# Patient Record
Sex: FEMALE | Race: WHITE | Employment: UNEMPLOYED | URBAN - METROPOLITAN AREA
[De-identification: names, ages, dates, MRNs, and addresses within clinical notes are randomized per-mention and may not be internally consistent; named-entity substitution may affect disease eponyms.]

---

## 2022-07-31 ENCOUNTER — HOSPITAL ENCOUNTER (EMERGENCY)
Age: 22
Discharge: HOME | End: 2022-07-31
Attending: STUDENT IN AN ORGANIZED HEALTH CARE EDUCATION/TRAINING PROGRAM
Payer: COMMERCIAL

## 2022-07-31 ENCOUNTER — APPOINTMENT (OUTPATIENT)
Dept: GENERAL RADIOLOGY | Age: 22
End: 2022-07-31
Attending: STUDENT IN AN ORGANIZED HEALTH CARE EDUCATION/TRAINING PROGRAM
Payer: COMMERCIAL

## 2022-07-31 VITALS
HEART RATE: 102 BPM | HEIGHT: 64 IN | WEIGHT: 252.65 LBS | RESPIRATION RATE: 16 BRPM | OXYGEN SATURATION: 96 % | DIASTOLIC BLOOD PRESSURE: 80 MMHG | TEMPERATURE: 98.1 F | BODY MASS INDEX: 43.13 KG/M2 | SYSTOLIC BLOOD PRESSURE: 135 MMHG

## 2022-07-31 DIAGNOSIS — S93.402A SPRAIN OF LEFT ANKLE, UNSPECIFIED LIGAMENT, INITIAL ENCOUNTER: Primary | ICD-10-CM

## 2022-07-31 PROCEDURE — 99283 EMERGENCY DEPT VISIT LOW MDM: CPT

## 2022-07-31 PROCEDURE — 73610 X-RAY EXAM OF ANKLE: CPT

## 2022-07-31 NOTE — DISCHARGE INSTRUCTIONS
You presents to the ED with left ankle pain after twisting her ankle. X-ray shows no acute fracture. This is most likely a sprain. Ankle splint applied here in the ED. Recommend taking Tylenol, Motrin for pain and swelling. Elevate and ice for 24 hours after the initial incident and then apply heat as needed. Recommend using cane or crutches to reduce loadbearing on this leg. If symptoms worsening or not improving please follow-up with your primary care doctor if you not have a primary care doctor use information or discharge paperwork to obtain a PCP.

## 2022-07-31 NOTE — ED NOTES
Pt given discharge instructions by Dr Fabienne Crump she verbalizes an understanding pt stable at time of discharge

## 2022-07-31 NOTE — ED TRIAGE NOTES
Pt ambulates to treatment area favoring her left ankle she states that last night she went to stand up from the couch and her left leg was asleep causing her to fall onto her left side. When she stood she heard a pop and now her ankle is swollen and the foot turned in. She has iced the ankle.

## 2022-08-01 NOTE — ED PROVIDER NOTES
Patient is a 22-year-old female presenting to the ED with left ankle pain after rolling her ankle earlier today. Patient's left foot is neurovascularly intact. Pain and tenderness over the lateral malleolus. No other trauma. No loss of consciousness. The history is provided by the patient. Fall  The accident occurred Yesterday. The fall occurred while standing. She fell from a height of ground level. She landed on Hard floor. There was no blood loss. Point of impact: Left ankle. Pain location: Left ankle. The pain is at a severity of 5/10. The pain is moderate. She was Ambulatory at the scene. There was No entrapment after the fall. There was No drug use involved in the accident. There was No alcohol use involved in the accident. Pertinent negatives include no loss of consciousness and no laceration. The symptoms are aggravated by pressure on injury and standing. She has tried acetaminophen and rest for the symptoms. The treatment provided no relief. It is unknown when the patient last had a tetanus shot. Ankle Injury        Past Medical History:   Diagnosis Date    Heavy periods     Migraines        Past Surgical History:   Procedure Laterality Date    HX WISDOM TEETH EXTRACTION           History reviewed. No pertinent family history.     Social History     Socioeconomic History    Marital status: SINGLE     Spouse name: Not on file    Number of children: Not on file    Years of education: Not on file    Highest education level: Not on file   Occupational History    Not on file   Tobacco Use    Smoking status: Never    Smokeless tobacco: Never   Substance and Sexual Activity    Alcohol use: Yes     Comment: socially    Drug use: No    Sexual activity: Not on file   Other Topics Concern    Not on file   Social History Narrative    Not on file     Social Determinants of Health     Financial Resource Strain: Not on file   Food Insecurity: Not on file   Transportation Needs: Not on file   Physical Activity: Not on file   Stress: Not on file   Social Connections: Not on file   Intimate Partner Violence: Not on file   Housing Stability: Not on file         ALLERGIES: Patient has no known allergies. Review of Systems   Constitutional: Negative. HENT: Negative. Eyes: Negative. Respiratory: Negative. Cardiovascular: Negative. Gastrointestinal: Negative. Endocrine: Negative. Genitourinary: Negative. Musculoskeletal:  Positive for arthralgias. Skin: Negative. Allergic/Immunologic: Negative. Neurological: Negative. Negative for loss of consciousness. Hematological: Negative. Psychiatric/Behavioral: Negative. Vitals:    07/31/22 1704   BP: 135/80   Pulse: (!) 102   Resp: 16   Temp: 98.1 °F (36.7 °C)   SpO2: 96%   Weight: 114.6 kg (252 lb 10.4 oz)   Height: 5' 4\" (1.626 m)            Physical Exam  Vitals and nursing note reviewed. Constitutional:       General: She is not in acute distress. Appearance: Normal appearance. HENT:      Head: Normocephalic and atraumatic. Right Ear: External ear normal.      Left Ear: External ear normal.      Nose: Nose normal.   Eyes:      Extraocular Movements: Extraocular movements intact. Conjunctiva/sclera: Conjunctivae normal.   Cardiovascular:      Rate and Rhythm: Normal rate. Pulses: Normal pulses. Radial pulses are 2+ on the right side and 2+ on the left side. Heart sounds: Normal heart sounds. Pulmonary:      Effort: Pulmonary effort is normal.      Breath sounds: Normal breath sounds. Abdominal:      General: Abdomen is flat. There is no distension. Tenderness: There is no abdominal tenderness. Musculoskeletal:         General: Swelling, tenderness and signs of injury present. No deformity. Normal range of motion. Cervical back: Normal range of motion and neck supple. No tenderness. Comments: Patient injury to left ankle, mild bruising and swelling of the left lateral malleolus. No obvious deformity. Neurovascularly intact left foot. Skin:     General: Skin is warm and dry. Capillary Refill: Capillary refill takes less than 2 seconds. Findings: No laceration. Neurological:      General: No focal deficit present. Mental Status: She is alert and oriented to person, place, and time. Psychiatric:         Attention and Perception: Attention normal.         Mood and Affect: Mood normal.         Behavior: Behavior normal.        [facility]    ED Course as of 07/31/22 6332   Sun Jul 31, 2022   6579 Left foot evaluated after splint placement. Neurovascular intact. Patient states foot feels better with the support. [AL]      ED Course User Index  [AL] Lawrence Zazueta MD     IMAGING RESULTS:  XR ANKLE LT MIN 3 V   Final Result   No acute bone abnormality. MEDICATIONS GIVEN:  Medications - No data to display    Differential diagnosis: Ankle sprain, ankle fracture, contusion    ED physician interpretation of imaging: 3 view left ankle x-ray without acute fracture or dislocation. [facility]: Patient is a 68-year-old female presented ED with left ankle pain after a fall last night with unremarkable x-ray who most likely has ankle sprain requiring ankle stirrup and appropriate outpatient follow-up with PCP if pain not improving. Further recommendations as below. Key Discharge Instructions and summary of care: You presents to the ED with left ankle pain after twisting her ankle. X-ray shows no acute fracture. This is most likely a sprain. Ankle splint applied here in the ED. Recommend taking Tylenol, Motrin for pain and swelling. Elevate and ice for 24 hours after the initial incident and then apply heat as needed. Recommend using cane or crutches to reduce loadbearing on this leg. If symptoms worsening or not improving please follow-up with your primary care doctor if you not have a primary care doctor use information or discharge paperwork to obtain a PCP.     ED Impression:    ICD-10-CM ICD-9-CM    1. Sprain of left ankle, unspecified ligament, initial encounter  S93.402A 845.00            DISPOSITION: Discharged    Song Mathew MD          Procedures

## 2023-10-24 ENCOUNTER — OFFICE VISIT (OUTPATIENT)
Age: 23
End: 2023-10-24
Payer: COMMERCIAL

## 2023-10-24 VITALS
BODY MASS INDEX: 43.55 KG/M2 | OXYGEN SATURATION: 96 % | DIASTOLIC BLOOD PRESSURE: 78 MMHG | HEART RATE: 90 BPM | SYSTOLIC BLOOD PRESSURE: 125 MMHG | HEIGHT: 63 IN | RESPIRATION RATE: 18 BRPM | WEIGHT: 245.8 LBS

## 2023-10-24 DIAGNOSIS — E66.01 CLASS 3 SEVERE OBESITY DUE TO EXCESS CALORIES WITHOUT SERIOUS COMORBIDITY WITH BODY MASS INDEX (BMI) OF 40.0 TO 44.9 IN ADULT (HCC): ICD-10-CM

## 2023-10-24 DIAGNOSIS — Z00.00 PHYSICAL EXAM: Primary | ICD-10-CM

## 2023-10-24 DIAGNOSIS — E28.2 PCOS (POLYCYSTIC OVARIAN SYNDROME): ICD-10-CM

## 2023-10-24 PROCEDURE — 99385 PREV VISIT NEW AGE 18-39: CPT | Performed by: NURSE PRACTITIONER

## 2023-10-24 RX ORDER — NORGESTIMATE AND ETHINYL ESTRADIOL 7DAYSX3 LO
1 KIT ORAL DAILY
COMMUNITY
Start: 2023-10-10

## 2023-10-24 SDOH — ECONOMIC STABILITY: FOOD INSECURITY: WITHIN THE PAST 12 MONTHS, YOU WORRIED THAT YOUR FOOD WOULD RUN OUT BEFORE YOU GOT MONEY TO BUY MORE.: NEVER TRUE

## 2023-10-24 SDOH — ECONOMIC STABILITY: HOUSING INSECURITY
IN THE LAST 12 MONTHS, WAS THERE A TIME WHEN YOU DID NOT HAVE A STEADY PLACE TO SLEEP OR SLEPT IN A SHELTER (INCLUDING NOW)?: NO

## 2023-10-24 SDOH — ECONOMIC STABILITY: INCOME INSECURITY: HOW HARD IS IT FOR YOU TO PAY FOR THE VERY BASICS LIKE FOOD, HOUSING, MEDICAL CARE, AND HEATING?: NOT HARD AT ALL

## 2023-10-24 SDOH — ECONOMIC STABILITY: FOOD INSECURITY: WITHIN THE PAST 12 MONTHS, THE FOOD YOU BOUGHT JUST DIDN'T LAST AND YOU DIDN'T HAVE MONEY TO GET MORE.: NEVER TRUE

## 2023-10-24 ASSESSMENT — PATIENT HEALTH QUESTIONNAIRE - PHQ9
1. LITTLE INTEREST OR PLEASURE IN DOING THINGS: 0
SUM OF ALL RESPONSES TO PHQ QUESTIONS 1-9: 0
SUM OF ALL RESPONSES TO PHQ QUESTIONS 1-9: 0
2. FEELING DOWN, DEPRESSED OR HOPELESS: 0
SUM OF ALL RESPONSES TO PHQ QUESTIONS 1-9: 0
SUM OF ALL RESPONSES TO PHQ9 QUESTIONS 1 & 2: 0
SUM OF ALL RESPONSES TO PHQ QUESTIONS 1-9: 0

## 2023-10-24 ASSESSMENT — ENCOUNTER SYMPTOMS
COUGH: 0
SHORTNESS OF BREATH: 1
CONSTIPATION: 0
DIARRHEA: 0

## 2023-10-24 NOTE — PROGRESS NOTES
I have reviewed all needed documentation in preparation for visit. Verified patient by name and date of birth  Chief Complaint   Patient presents with    New Patient    Establish Care     No recent pcp. [facility] used to be pcp. Vitals:    10/24/23 1413   BP: 125/78   Site: Right Upper Arm   Position: Sitting   Cuff Size: Large Adult   Pulse: 90   Resp: 18   SpO2: 96%   Weight: 111.5 kg (245 lb 12.8 oz)   Height: 1.61 m (5' 3.39\")       Health Maintenance Due   Topic Date Due    Hepatitis B vaccine (1 of 3 - 3-dose series) Never done    COVID-19 Vaccine (1) Never done    Varicella vaccine (1 of 2 - 2-dose childhood series) Never done    HPV vaccine (1 - 2-dose series) Never done    Depression Screen  Never done    HIV screen  Never done    Hepatitis C screen  Never done    DTaP/Tdap/Td vaccine (1 - Tdap) Never done    Pap smear  Never done    Flu vaccine (1) Never done       1. \"Have you been to the ER, urgent care clinic since your last visit? Hospitalized since your last visit? \" No     2. \"Have you seen or consulted any other health care providers outside of the [address] [place] [address] since your last visit? \" No     3. For patients aged 43-73: Has the patient had a colonoscopy / FIT/ Cologuard? N/a      If the patient is female:    4. For patients aged 43-66: Has the patient had a mammogram within the past 2 years? N/a      5. For patients aged 21-65: Has the patient had a pap smear? Yes up to date.          Andrew Morin LPN
review next office visit  PCOS (polycystic ovarian syndrome)  OC/ question metformin

## 2023-12-28 ENCOUNTER — TELEMEDICINE (OUTPATIENT)
Age: 23
End: 2023-12-28
Payer: COMMERCIAL

## 2023-12-28 DIAGNOSIS — B35.3 TINEA PEDIS OF BOTH FEET: ICD-10-CM

## 2023-12-28 DIAGNOSIS — L85.3 DRY SKIN DERMATITIS: ICD-10-CM

## 2023-12-28 DIAGNOSIS — L21.9 SEBORRHEIC DERMATITIS OF SCALP: Primary | ICD-10-CM

## 2023-12-28 PROCEDURE — 99213 OFFICE O/P EST LOW 20 MIN: CPT | Performed by: NURSE PRACTITIONER

## 2023-12-28 RX ORDER — KETOCONAZOLE 20 MG/ML
SHAMPOO TOPICAL
Qty: 120 ML | Refills: 1 | Status: SHIPPED | OUTPATIENT
Start: 2023-12-28

## 2023-12-28 RX ORDER — CLOBETASOL PROPIONATE 0.46 MG/ML
SOLUTION TOPICAL
Qty: 50 ML | Refills: 2 | Status: CANCELLED | OUTPATIENT
Start: 2023-12-28

## 2023-12-28 RX ORDER — KETOCONAZOLE 20 MG/G
CREAM TOPICAL
Qty: 30 G | Refills: 1 | Status: SHIPPED | OUTPATIENT
Start: 2023-12-28

## 2023-12-28 NOTE — PROGRESS NOTES
I have reviewed all needed documentation in preparation for visit. Verified patient by name and date of birth  Chief Complaint   Patient presents with    Psoriasis     Itchy scalp for 2 months. Tried anti itch shampoo and several other shampoos and ointments to no avail. There were no vitals filed for this visit. Health Maintenance Due   Topic Date Due    Hepatitis B vaccine (1 of 3 - 3-dose series) Never done    COVID-19 Vaccine (1) Never done    Varicella vaccine (1 of 2 - 2-dose childhood series) Never done    HPV vaccine (1 - 2-dose series) Never done    HIV screen  Never done    DTaP/Tdap/Td vaccine (1 - Tdap) Never done    Pap smear  Never done       1. \"Have you been to the ER, urgent care clinic since your last visit? Hospitalized since your last visit? \" No     2. \"Have you seen or consulted any other health care providers outside of the [address] [place] [address] since your last visit? \" ObGyn      3. For patients aged 43-73: Has the patient had a colonoscopy / FIT/ Cologuard? N/a      If the patient is female:    4. For patients aged 43-66: Has the patient had a mammogram within the past 2 years? N/a      5. For patients aged 21-65: Has the patient had a pap smear? Yes up to date.          Elysia Pineda LPN
function) (limited exam due to video visit)          [x] No gaze palsy        [] Abnormal -          Skin:        [x] No significant exanthematous lesions or discoloration noted on facial skin         [] Abnormal -  attempt to visualize scalp patch. Area without redness. Foot also appeared normal, Skin intact. No lesions. Psychiatric:       [x] Normal Affect [] Abnormal -        [x] No Hallucinations    Other pertinent observable physical exam findings:-        We discussed the expected course, resolution and complications of the diagnosis(es) in detail. Medication risks, benefits, costs, interactions, and alternatives were discussed as indicated. I advised her to contact the office if her condition worsens, changes or fails to improve as anticipated. She expressed understanding with the diagnosis(es) and plan. Bhargav Merlos, was evaluated through a synchronous (real-time) audio-video encounter. The patient (or guardian if applicable) is aware that this is a billable service, which includes applicable co-pays. This Virtual Visit was conducted with patient's (and/or legal guardian's) consent. Patient identification was verified, and a caregiver was present when appropriate.    The patient was located at Home: [address] [place] [address]  Provider was located at [facility] (Appt Dept): [address],  [address] [address]